# Patient Record
Sex: MALE | Race: WHITE | NOT HISPANIC OR LATINO | Employment: FULL TIME | URBAN - METROPOLITAN AREA
[De-identification: names, ages, dates, MRNs, and addresses within clinical notes are randomized per-mention and may not be internally consistent; named-entity substitution may affect disease eponyms.]

---

## 2023-03-21 ENCOUNTER — HOSPITAL ENCOUNTER (OUTPATIENT)
Facility: CLINIC | Age: 53
Setting detail: OBSERVATION
Discharge: HOME | End: 2023-03-22
Attending: INTERNAL MEDICINE | Admitting: HOSPITALIST
Payer: COMMERCIAL

## 2023-03-21 VITALS
WEIGHT: 210 LBS | OXYGEN SATURATION: 97 % | RESPIRATION RATE: 18 BRPM | BODY MASS INDEX: 27.83 KG/M2 | TEMPERATURE: 98.1 F | SYSTOLIC BLOOD PRESSURE: 135 MMHG | DIASTOLIC BLOOD PRESSURE: 89 MMHG | HEART RATE: 64 BPM | HEIGHT: 73 IN
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
## 2023-03-21 DIAGNOSIS — L03.113 CELLULITIS OF RIGHT UPPER EXTREMITY: Primary | ICD-10-CM

## 2023-03-21 DIAGNOSIS — S61.451D CAT BITE OF HAND, RIGHT, SUBSEQUENT ENCOUNTER: ICD-10-CM

## 2023-03-21 PROBLEM — L03.90 CELLULITIS: Status: ACTIVE | Noted: 2023-03-21

## 2023-03-21 LAB
ALBUMIN SERPL BCG-MCNC: 3.9 G/DL (ref 3.5–5.2)
ALP SERPL-CCNC: 64 U/L (ref 40–129)
ALT SERPL W P-5'-P-CCNC: 17 U/L (ref 10–50)
ANION GAP SERPL CALCULATED.3IONS-SCNC: 6 MMOL/L (ref 7–15)
AST SERPL W P-5'-P-CCNC: 16 U/L (ref 10–50)
BILIRUB SERPL-MCNC: 0.6 MG/DL
BUN SERPL-MCNC: 10.9 MG/DL (ref 6–20)
CALCIUM SERPL-MCNC: 8.7 MG/DL (ref 8.6–10)
CHLORIDE SERPL-SCNC: 102 MMOL/L (ref 98–107)
CREAT SERPL-MCNC: 0.87 MG/DL (ref 0.67–1.17)
DEPRECATED HCO3 PLAS-SCNC: 27 MMOL/L (ref 22–29)
ERYTHROCYTE [DISTWIDTH] IN BLOOD BY AUTOMATED COUNT: 12.4 % (ref 10–15)
GFR SERPL CREATININE-BSD FRML MDRD: >90 ML/MIN/1.73M2
GLUCOSE SERPL-MCNC: 86 MG/DL (ref 70–99)
HCT VFR BLD AUTO: 39.2 % (ref 40–53)
HGB BLD-MCNC: 12.9 G/DL (ref 13.3–17.7)
HOLD SPECIMEN: NORMAL
MCH RBC QN AUTO: 31.1 PG (ref 26.5–33)
MCHC RBC AUTO-ENTMCNC: 32.9 G/DL (ref 31.5–36.5)
MCV RBC AUTO: 95 FL (ref 78–100)
PLATELET # BLD AUTO: 240 10E3/UL (ref 150–450)
POTASSIUM SERPL-SCNC: 3.9 MMOL/L (ref 3.4–5.3)
PROT SERPL-MCNC: 6.6 G/DL (ref 6.4–8.3)
RBC # BLD AUTO: 4.15 10E6/UL (ref 4.4–5.9)
SODIUM SERPL-SCNC: 135 MMOL/L (ref 136–145)
WBC # BLD AUTO: 6.8 10E3/UL (ref 4–11)

## 2023-03-21 PROCEDURE — 96374 THER/PROPH/DIAG INJ IV PUSH: CPT

## 2023-03-21 PROCEDURE — 99222 1ST HOSP IP/OBS MODERATE 55: CPT | Mod: AI | Performed by: STUDENT IN AN ORGANIZED HEALTH CARE EDUCATION/TRAINING PROGRAM

## 2023-03-21 PROCEDURE — 120N000001 HC R&B MED SURG/OB

## 2023-03-21 PROCEDURE — 250N000013 HC RX MED GY IP 250 OP 250 PS 637: Performed by: STUDENT IN AN ORGANIZED HEALTH CARE EDUCATION/TRAINING PROGRAM

## 2023-03-21 PROCEDURE — 80053 COMPREHEN METABOLIC PANEL: CPT | Performed by: STUDENT IN AN ORGANIZED HEALTH CARE EDUCATION/TRAINING PROGRAM

## 2023-03-21 PROCEDURE — 36415 COLL VENOUS BLD VENIPUNCTURE: CPT | Performed by: STUDENT IN AN ORGANIZED HEALTH CARE EDUCATION/TRAINING PROGRAM

## 2023-03-21 PROCEDURE — 250N000011 HC RX IP 250 OP 636: Performed by: STUDENT IN AN ORGANIZED HEALTH CARE EDUCATION/TRAINING PROGRAM

## 2023-03-21 PROCEDURE — 96376 TX/PRO/DX INJ SAME DRUG ADON: CPT

## 2023-03-21 PROCEDURE — 96375 TX/PRO/DX INJ NEW DRUG ADDON: CPT

## 2023-03-21 PROCEDURE — 85027 COMPLETE CBC AUTOMATED: CPT | Performed by: STUDENT IN AN ORGANIZED HEALTH CARE EDUCATION/TRAINING PROGRAM

## 2023-03-21 PROCEDURE — 258N000003 HC RX IP 258 OP 636: Performed by: STUDENT IN AN ORGANIZED HEALTH CARE EDUCATION/TRAINING PROGRAM

## 2023-03-21 PROCEDURE — 87040 BLOOD CULTURE FOR BACTERIA: CPT | Mod: 59 | Performed by: STUDENT IN AN ORGANIZED HEALTH CARE EDUCATION/TRAINING PROGRAM

## 2023-03-21 RX ORDER — NALOXONE HYDROCHLORIDE 0.4 MG/ML
0.4 INJECTION, SOLUTION INTRAMUSCULAR; INTRAVENOUS; SUBCUTANEOUS
Status: DISCONTINUED | OUTPATIENT
Start: 2023-03-21 | End: 2023-03-22 | Stop reason: HOSPADM

## 2023-03-21 RX ORDER — OXYCODONE HYDROCHLORIDE 5 MG/1
5 TABLET ORAL EVERY 4 HOURS PRN
Status: DISCONTINUED | OUTPATIENT
Start: 2023-03-21 | End: 2023-03-22 | Stop reason: HOSPADM

## 2023-03-21 RX ORDER — NALOXONE HYDROCHLORIDE 0.4 MG/ML
0.2 INJECTION, SOLUTION INTRAMUSCULAR; INTRAVENOUS; SUBCUTANEOUS
Status: DISCONTINUED | OUTPATIENT
Start: 2023-03-21 | End: 2023-03-22 | Stop reason: HOSPADM

## 2023-03-21 RX ORDER — PROCHLORPERAZINE 25 MG
25 SUPPOSITORY, RECTAL RECTAL EVERY 12 HOURS PRN
Status: DISCONTINUED | OUTPATIENT
Start: 2023-03-21 | End: 2023-03-22 | Stop reason: HOSPADM

## 2023-03-21 RX ORDER — AMOXICILLIN 250 MG
2 CAPSULE ORAL 2 TIMES DAILY PRN
Status: DISCONTINUED | OUTPATIENT
Start: 2023-03-21 | End: 2023-03-22 | Stop reason: HOSPADM

## 2023-03-21 RX ORDER — IBUPROFEN 600 MG/1
600 TABLET, FILM COATED ORAL EVERY 6 HOURS PRN
Status: DISCONTINUED | OUTPATIENT
Start: 2023-03-21 | End: 2023-03-22 | Stop reason: HOSPADM

## 2023-03-21 RX ORDER — DOXYCYCLINE 100 MG/10ML
100 INJECTION, POWDER, LYOPHILIZED, FOR SOLUTION INTRAVENOUS EVERY 12 HOURS
Status: DISCONTINUED | OUTPATIENT
Start: 2023-03-21 | End: 2023-03-22

## 2023-03-21 RX ORDER — TRAMADOL HYDROCHLORIDE 50 MG/1
50 TABLET ORAL EVERY 6 HOURS PRN
COMMUNITY

## 2023-03-21 RX ORDER — PIPERACILLIN SODIUM, TAZOBACTAM SODIUM 3; .375 G/15ML; G/15ML
3.38 INJECTION, POWDER, LYOPHILIZED, FOR SOLUTION INTRAVENOUS EVERY 6 HOURS
Status: DISCONTINUED | OUTPATIENT
Start: 2023-03-21 | End: 2023-03-22

## 2023-03-21 RX ORDER — PROCHLORPERAZINE MALEATE 10 MG
10 TABLET ORAL EVERY 6 HOURS PRN
Status: DISCONTINUED | OUTPATIENT
Start: 2023-03-21 | End: 2023-03-22 | Stop reason: HOSPADM

## 2023-03-21 RX ORDER — ACETAMINOPHEN 325 MG/1
650 TABLET ORAL EVERY 6 HOURS PRN
Status: DISCONTINUED | OUTPATIENT
Start: 2023-03-21 | End: 2023-03-22 | Stop reason: HOSPADM

## 2023-03-21 RX ORDER — BISACODYL 10 MG
10 SUPPOSITORY, RECTAL RECTAL DAILY PRN
Status: DISCONTINUED | OUTPATIENT
Start: 2023-03-21 | End: 2023-03-22 | Stop reason: HOSPADM

## 2023-03-21 RX ORDER — HYDROMORPHONE HCL IN WATER/PF 6 MG/30 ML
0.2 PATIENT CONTROLLED ANALGESIA SYRINGE INTRAVENOUS
Status: DISCONTINUED | OUTPATIENT
Start: 2023-03-21 | End: 2023-03-22 | Stop reason: HOSPADM

## 2023-03-21 RX ORDER — AMOXICILLIN 250 MG
1 CAPSULE ORAL 2 TIMES DAILY PRN
Status: DISCONTINUED | OUTPATIENT
Start: 2023-03-21 | End: 2023-03-22 | Stop reason: HOSPADM

## 2023-03-21 RX ORDER — POLYETHYLENE GLYCOL 3350 17 G/17G
17 POWDER, FOR SOLUTION ORAL DAILY PRN
Status: DISCONTINUED | OUTPATIENT
Start: 2023-03-21 | End: 2023-03-22 | Stop reason: HOSPADM

## 2023-03-21 RX ORDER — HYDROMORPHONE HCL IN WATER/PF 6 MG/30 ML
0.4 PATIENT CONTROLLED ANALGESIA SYRINGE INTRAVENOUS
Status: DISCONTINUED | OUTPATIENT
Start: 2023-03-21 | End: 2023-03-22 | Stop reason: HOSPADM

## 2023-03-21 RX ORDER — LIDOCAINE 40 MG/G
CREAM TOPICAL
Status: DISCONTINUED | OUTPATIENT
Start: 2023-03-21 | End: 2023-03-22 | Stop reason: HOSPADM

## 2023-03-21 RX ORDER — ONDANSETRON 4 MG/1
4 TABLET, ORALLY DISINTEGRATING ORAL EVERY 6 HOURS PRN
Status: DISCONTINUED | OUTPATIENT
Start: 2023-03-21 | End: 2023-03-22 | Stop reason: HOSPADM

## 2023-03-21 RX ORDER — ACETAMINOPHEN 650 MG/1
650 SUPPOSITORY RECTAL EVERY 6 HOURS PRN
Status: DISCONTINUED | OUTPATIENT
Start: 2023-03-21 | End: 2023-03-22 | Stop reason: HOSPADM

## 2023-03-21 RX ORDER — ONDANSETRON 2 MG/ML
4 INJECTION INTRAMUSCULAR; INTRAVENOUS EVERY 6 HOURS PRN
Status: DISCONTINUED | OUTPATIENT
Start: 2023-03-21 | End: 2023-03-22 | Stop reason: HOSPADM

## 2023-03-21 RX ADMIN — PIPERACILLIN AND TAZOBACTAM 3.38 G: 3; .375 INJECTION, POWDER, FOR SOLUTION INTRAVENOUS at 17:13

## 2023-03-21 RX ADMIN — DOXYCYCLINE 100 MG: 100 INJECTION, POWDER, LYOPHILIZED, FOR SOLUTION INTRAVENOUS at 18:54

## 2023-03-21 RX ADMIN — ACETAMINOPHEN 650 MG: 325 TABLET, FILM COATED ORAL at 16:52

## 2023-03-21 RX ADMIN — VANCOMYCIN HYDROCHLORIDE 1250 MG: 10 INJECTION, POWDER, LYOPHILIZED, FOR SOLUTION INTRAVENOUS at 20:02

## 2023-03-21 RX ADMIN — PIPERACILLIN AND TAZOBACTAM 3.38 G: 3; .375 INJECTION, POWDER, FOR SOLUTION INTRAVENOUS at 23:10

## 2023-03-21 RX ADMIN — OXYCODONE HYDROCHLORIDE 5 MG: 5 TABLET ORAL at 21:49

## 2023-03-21 RX ADMIN — OXYCODONE HYDROCHLORIDE 5 MG: 5 TABLET ORAL at 16:52

## 2023-03-21 ASSESSMENT — ACTIVITIES OF DAILY LIVING (ADL)
ADLS_ACUITY_SCORE: 35

## 2023-03-21 NOTE — PHARMACY-VANCOMYCIN DOSING SERVICE
"Pharmacy Vancomycin Initial Note  Date of Service 2023  Patient's  1970  52 year old, male    Indication: Skin and Soft Tissue Infection and cat scratch/bite    Current estimated CrCl = Estimated Creatinine Clearance: 136.9 mL/min (based on SCr of 0.87 mg/dL).    Creatinine for last 3 days  3/21/2023:  5:21 PM Creatinine 0.87 mg/dL    Recent Vancomycin Level(s) for last 3 days  No results found for requested labs within last 72 hours.      Vancomycin IV Administrations (past 72 hours)      No vancomycin orders with administrations in past 72 hours.                Nephrotoxins and other renal medications (From now, onward)    Start     Dose/Rate Route Frequency Ordered Stop    23 1900  vancomycin (VANCOCIN) 1,250 mg in 0.9% NaCl 250 mL intermittent infusion         1,250 mg  over 90 Minutes Intravenous EVERY 12 HOURS 23 1835      23 1700  piperacillin-tazobactam (ZOSYN) 3.375 g vial to attach to  mL bag        Note to Pharmacy: For [facility], [facility] and [facility]: For Zosyn-naive patients, use the \"Zosyn initial dose + extended infusion\" order panel.    3.375 g  over 30 Minutes Intravenous EVERY 6 HOURS 23 1610      23 1609  ibuprofen (ADVIL/MOTRIN) tablet 600 mg         600 mg Oral EVERY 6 HOURS PRN 23 1609            Contrast Orders - past 72 hours (72h ago, onward)    None          [employer] Prediction of Planned Initial Vancomycin Regimen  Loading dose: N/A  Regimen: 1250 mg IV every 12 hours.  Start time: 18:36 on 2023  Exposure target: AUC24 (range)400-600 mg/L.hr   AUC24,ss: 491 mg/L.hr  Probability of AUC24 > 400: 72 %  Ctrough,ss: 15.2 mg/L  Probability of Ctrough,ss > 20: 27 %  Probability of nephrotoxicity (Lodise MARCUS ): 10 %          Plan:  1. Start vancomycin  1250 mg IV q12h.   2. Vancomycin monitoring method: AUC  3. Vancomycin therapeutic monitoring goal: 400-600 mg*h/L  4. Pharmacy will check vancomycin levels as appropriate in 1-3 Days.  "   5. Serum creatinine levels will be ordered daily for the first week of therapy and at least twice weekly for subsequent weeks.      Tia Little RPH

## 2023-03-21 NOTE — H&P
[facility]    History and Physical - Hospitalist Service       Date of Admission:  3/21/2023    Assessment & Plan      Abdoul Bobby is a 52 year old male admitted on 3/21/2023. He presents with spreading cellulitis without lymphadenitis after a cat scratch on 3/19/2023.    Cellulitis due to cat bite and scratch  *Suffered cat bite and scratch on 3/19/2023 at a birthday party for his nephew.  The cat historically is irascible, and this behavior is not out of the abnormal for him.  *Patient believes that the cat is vaccinated for rabies, he will double check with the owners.  *Seen at urgent care on 3/20 due to hand swelling.  On 3/21 he had soreness and swelling extending up the length of his arm in a lymphangitic pattern.  See pictures in media tab.  - Inpatient  - Vancomycin and Zosyn  - No obvious lymphadenitis at this time, but will also start doxycycline  - If cat has not been vaccinated for rabies on double check, will order immunoglobulin and vaccine.  - Consult orthopedics due to cellulitis crossing hand and elbow.  - Consult infectious disease due to vector of transmission and rapid spread.  -Collect stat CBC, CMP, blood cultures (note the patient has been on Augmentin for 1 to 2 days and has received Zosyn on day of admission)    Addendum: cat was vaccinated May 2022       Diet: Combination Diet Regular Diet Adult  DVT Prophylaxis: Pneumatic Compression Devices  Tinajero Catheter: Not present  Lines: None     Cardiac Monitoring: None  Code Status: Full Code    Clinically Significant Risk Factors Present on Admission                               Disposition Plan      Expected Discharge Date: 03/23/2023                  Siva Leong MD  Hospitalist Service  [facility]  Securely message with Josiane (more info)  Text page via [facility] Paging/Directory     ______________________________________________________________________    Chief Complaint   Cat  scratch    History is obtained from the patient and electronic health record    History of Present Illness   Abdoul Bobby is a 52 year old male who has limited past medical history.  He presents to the hospital on 3/21/2023 after transfer from outside urgency room.  He was at a birthday party on 3/19/2023.  He was bitten and scratched by a cat multiple times on his right hand at that time.  This was reportedly unprovoked, however the cat has a history of being irascible and aggressive.  The cat is otherwise been eating and drinking well per report.  The cat is reportedly vaccinated for rabies.  He was seen in urgent care on 3/20.  At that time he was given Augmentin.  He noted on 3/21 when he woke that there was spreading erythema up his arm with associated tenderness around his forearm and elbow.    He otherwise has no associated significant symptoms    Past Medical History    Past Medical History:   Diagnosis Date     Back pain        Past Surgical History   Past Surgical History:   Procedure Laterality Date     APPENDECTOMY       APPENDECTOMY       BACK SURGERY       BACK SURGERY       ELBOW SURGERY       ELBOW SURGERY       KNEE SURGERY       KNEE SURGERY Bilateral     ACL and meniscus repair (2 surgeries)     SINUS SURGERY      x 2     TONSILLECTOMY       ZZC CRUZ W/O FACETEC FORAMOT/[facility]  VRT SEG, CERVICAL Left 2016    Procedure: LEFT C5-6 C6-7 HEMILAMINECTOMY, MICRODISCECTOMY;  Surgeon: Teresa Recinos MD;  Location: [facility];  Service: Spine       Prior to Admission Medications   Prior to Admission Medications   Prescriptions Last Dose Informant Patient Reported? Taking?   ALBUTEROL 90 MCG/ACT IN AERS  Self No No   Si-2 puffs Q4-6hrs PRN wheezing   HYDROmorphone (DILAUDID) 4 MG tablet   No No   Sig: Take 1 tablet (4 mg) by mouth every 6 hours as needed for moderate to severe pain   TRAMADOL HCL PO  Self Yes No   Sig: Take 50 mg by mouth every 6 hours as needed     cyclobenzaprine (FLEXERIL) 5 MG tablet   No No   Sig: Take 1 tablet (5 mg) by mouth 3 times daily as needed for muscle spasms   methylprednisoLONE (MEDROL DOSEPAK) 4 MG tablet   No No   Sig: Follow package instructions   multivitamin, therapeutic with minerals (MULTI-VITAMIN) TABS  Self Yes No   Sig: Take 1 tablet by mouth daily [employer] gummy   oxyCODONE (ROXICODONE) 5 MG immediate release tablet   No No   Sig: Take 1-2 tablets (5-10 mg) by mouth every 3 hours as needed      Facility-Administered Medications: None         Physical Exam   Vital Signs: Temp: 97.2  F (36.2  C) Temp src: Oral BP: (!) 155/94 Pulse: 56   Resp: 20 SpO2: 100 % O2 Device: None (Room air)    Weight: 0 lbs 0 oz    Constitutional: Awake, alert, cooperative, no apparent distress  Respiratory: Clear to auscultation bilaterally, no crackles or wheezing  Cardiovascular: Regular rate and rhythm, normal S1 and S2, and no murmur noted  GI: Normal bowel sounds, soft, non-distended, non-tender  Skin/Integumen: Scattered puncture wounds on right posterior hand and palmar surface.  There is significant erythema and induration of the right hand.  There is spreading redness up the right arm across the right elbow, right forearm, and a linear track of erythema up the inner right arm.  This area is tender to palpation mildly.  Other:       Medical Decision Making       65 MINUTES SPENT BY ME on the date of service doing chart review, history, exam, documentation & further activities per the note.      Data   ------------------------- PAST 24 HR DATA REVIEWED -----------------------------------------------        Imaging results reviewed over the past 24 hrs:   No results found for this or any previous visit (from the past 24 hour(s)).

## 2023-03-21 NOTE — PHARMACY-ADMISSION MEDICATION HISTORY
Pharmacy Medication History  Admission medication history interview status for the 3/21/2023  admission is complete. See EPIC admission navigator for prior to admission medications     Location of Interview: Phone  Medication history sources: Patient and Care Everywhere    Significant changes made to the medication list:  Added: augmentin--patient states he took the first dose last night    In the past week, patient estimated taking medication this percent of the time: greater than 90%    Medication Affordability:  Not including over the counter (OTC) medications, was there a time in the past 12 months when you did not take your medications as prescribed because of cost?: Yes (Arnuity was not covered by insurance so has not been taking)  Has this happened in the past 3 months?: Yes    Additional medication history information:   none    Medication reconciliation completed by provider prior to medication history? No    Time spent in this activity: 15 minutes    Prior to Admission medications    Medication Sig Last Dose Taking? Auth Provider Long Term End Date   amoxicillin-clavulanate (AUGMENTIN) 875-125 MG tablet Take 1 tablet by mouth 2 times daily For 10 days (started 3/20/23 pm) 3/21/2023 at am Yes Unknown, Entered By History     multivitamin w/minerals (THERA-VIT-M) tablet Take 1 tablet by mouth daily 3/21/2023 Yes Unknown, Entered By History     traMADol (ULTRAM) 50 MG tablet Take 50 mg by mouth every 6 hours as needed for severe pain (7-10) PRN Yes Unknown, Entered By History     ALBUTEROL 90 MCG/ACT IN AERS 1-2 puffs Q4-6hrs PRN wheezing PRN  Elliott Lea, PA-C         The information provided in this note is only as accurate as the sources available at the time of update(s)

## 2023-03-21 NOTE — PLAN OF CARE
Goal Outcome Evaluation:    Shift Summary [id number]    Admitting Diagnosis: Cellulitis   Vitals VSS   Pain 6/10. Taking Oxycodone, Tylenol  PRN. Last dose 1652  A&Ox4  Voiding independent in the BR  Mobility Independent   Tele None  CMS Inatact, baseline intermittent N/T r/t to previous spinal surgery  Lung Sounds WDL on RA  GI WDL  Dressing R hand Cat bite wound open to air  Diet Regular  PIV SL ex of intermittent abx

## 2023-03-22 LAB
ANION GAP SERPL CALCULATED.3IONS-SCNC: 7 MMOL/L (ref 7–15)
BUN SERPL-MCNC: 8.8 MG/DL (ref 6–20)
CALCIUM SERPL-MCNC: 8.7 MG/DL (ref 8.6–10)
CHLORIDE SERPL-SCNC: 106 MMOL/L (ref 98–107)
CREAT SERPL-MCNC: 0.93 MG/DL (ref 0.67–1.17)
DEPRECATED HCO3 PLAS-SCNC: 24 MMOL/L (ref 22–29)
ERYTHROCYTE [DISTWIDTH] IN BLOOD BY AUTOMATED COUNT: 12.6 % (ref 10–15)
GFR SERPL CREATININE-BSD FRML MDRD: >90 ML/MIN/1.73M2
GLUCOSE SERPL-MCNC: 117 MG/DL (ref 70–99)
HCT VFR BLD AUTO: 38.2 % (ref 40–53)
HGB BLD-MCNC: 12.4 G/DL (ref 13.3–17.7)
MCH RBC QN AUTO: 31 PG (ref 26.5–33)
MCHC RBC AUTO-ENTMCNC: 32.5 G/DL (ref 31.5–36.5)
MCV RBC AUTO: 96 FL (ref 78–100)
PLATELET # BLD AUTO: 211 10E3/UL (ref 150–450)
POTASSIUM SERPL-SCNC: 4.2 MMOL/L (ref 3.4–5.3)
RBC # BLD AUTO: 4 10E6/UL (ref 4.4–5.9)
SODIUM SERPL-SCNC: 137 MMOL/L (ref 136–145)
WBC # BLD AUTO: 5.8 10E3/UL (ref 4–11)

## 2023-03-22 PROCEDURE — 96375 TX/PRO/DX INJ NEW DRUG ADDON: CPT

## 2023-03-22 PROCEDURE — 250N000011 HC RX IP 250 OP 636: Performed by: INTERNAL MEDICINE

## 2023-03-22 PROCEDURE — 96376 TX/PRO/DX INJ SAME DRUG ADON: CPT

## 2023-03-22 PROCEDURE — 85027 COMPLETE CBC AUTOMATED: CPT | Performed by: STUDENT IN AN ORGANIZED HEALTH CARE EDUCATION/TRAINING PROGRAM

## 2023-03-22 PROCEDURE — 250N000011 HC RX IP 250 OP 636: Performed by: STUDENT IN AN ORGANIZED HEALTH CARE EDUCATION/TRAINING PROGRAM

## 2023-03-22 PROCEDURE — 250N000013 HC RX MED GY IP 250 OP 250 PS 637: Performed by: STUDENT IN AN ORGANIZED HEALTH CARE EDUCATION/TRAINING PROGRAM

## 2023-03-22 PROCEDURE — 99239 HOSP IP/OBS DSCHRG MGMT >30: CPT | Performed by: STUDENT IN AN ORGANIZED HEALTH CARE EDUCATION/TRAINING PROGRAM

## 2023-03-22 PROCEDURE — 36415 COLL VENOUS BLD VENIPUNCTURE: CPT | Performed by: STUDENT IN AN ORGANIZED HEALTH CARE EDUCATION/TRAINING PROGRAM

## 2023-03-22 PROCEDURE — 258N000003 HC RX IP 258 OP 636: Performed by: STUDENT IN AN ORGANIZED HEALTH CARE EDUCATION/TRAINING PROGRAM

## 2023-03-22 PROCEDURE — 82310 ASSAY OF CALCIUM: CPT | Performed by: STUDENT IN AN ORGANIZED HEALTH CARE EDUCATION/TRAINING PROGRAM

## 2023-03-22 PROCEDURE — 99222 1ST HOSP IP/OBS MODERATE 55: CPT | Performed by: INTERNAL MEDICINE

## 2023-03-22 PROCEDURE — G0378 HOSPITAL OBSERVATION PER HR: HCPCS

## 2023-03-22 RX ORDER — CEFTRIAXONE 2 G/1
2 INJECTION, POWDER, FOR SOLUTION INTRAMUSCULAR; INTRAVENOUS EVERY 24 HOURS
Status: DISCONTINUED | OUTPATIENT
Start: 2023-03-22 | End: 2023-03-22 | Stop reason: HOSPADM

## 2023-03-22 RX ORDER — OXYCODONE HYDROCHLORIDE 5 MG/1
2.5 TABLET ORAL EVERY 4 HOURS PRN
Qty: 4 TABLET | Refills: 0 | Status: SHIPPED | OUTPATIENT
Start: 2023-03-22

## 2023-03-22 RX ADMIN — PIPERACILLIN AND TAZOBACTAM 3.38 G: 3; .375 INJECTION, POWDER, FOR SOLUTION INTRAVENOUS at 05:02

## 2023-03-22 RX ADMIN — VANCOMYCIN HYDROCHLORIDE 1250 MG: 10 INJECTION, POWDER, LYOPHILIZED, FOR SOLUTION INTRAVENOUS at 08:05

## 2023-03-22 RX ADMIN — OXYCODONE HYDROCHLORIDE 5 MG: 5 TABLET ORAL at 08:03

## 2023-03-22 RX ADMIN — DOXYCYCLINE 100 MG: 100 INJECTION, POWDER, LYOPHILIZED, FOR SOLUTION INTRAVENOUS at 05:46

## 2023-03-22 RX ADMIN — CEFTRIAXONE SODIUM 2 G: 2 INJECTION, POWDER, FOR SOLUTION INTRAMUSCULAR; INTRAVENOUS at 10:39

## 2023-03-22 ASSESSMENT — ACTIVITIES OF DAILY LIVING (ADL)
ADLS_ACUITY_SCORE: 35

## 2023-03-22 NOTE — CONSULTS
ID consult dictated IMP 1 53 yo male acute R hand cat bite related infection, impressive initial appaearance and lymphangitis wo sepsis, should be pasturella but with lymphangitis ? Strep also or mainly    REC rapidly better, unlikely bacteremic and no sign deep infection , very predictable micro,no need doxy or this type broad ABX, dose of ceftriaxone now I am Ok back to augmentin for 10 days and home OK as soon as now

## 2023-03-22 NOTE — PLAN OF CARE
Goal Outcome Evaluation:    Admitting Diagnosis: Cellulitis   Cat bite of hand, right, subsequent encounter    Vitals VSS  Pain 5/10. Taking OxyPRN. Last dose 0800 am  A&Ox4  Voiding BR  Mobility indep  Tele None  CMS Intact  Lung Sounds WDLon RA  GI WDL  Dressing LEISA    Orders Placed This Encounter      Combination Diet Regular Diet Adult      Diet     Plan: discharging Home, Pt driving self. AVS printed instructions reviewed with Pt, with teaches back. Patient self driving home, MD aware.

## 2023-03-22 NOTE — DISCHARGE SUMMARY
[facility]  Hospitalist Discharge Summary      Date of Admission:  3/21/2023  Date of Discharge:  3/22/2023  Discharging Provider: Coy Rush PA-C  Discharge Service: Hospitalist Service    Discharge Diagnoses   Cellulitis of right upper extremity secondary to cat bite    Follow-ups Needed After Discharge   Follow-up Appointments     Follow-up and recommended labs and tests       Follow up with primary care provider, BILLIE CLIFTON, within 7 days   for hospital follow- up.  No follow up labs or test are needed.           Unresulted Labs Ordered in the Past 30 Days of this Admission     Date and Time Order Name Status Description    3/21/2023  4:07 PM Blood Culture Arm, Right Preliminary     3/21/2023  4:07 PM Blood Culture Hand, Left Preliminary       These results will be followed up by PCP    Discharge Disposition   Discharged to home  Condition at discharge: Stable    Hospital Course   Abdoul Bobby is a 52 year old male with PMHx of asthma and back pain.  Patient suffered a cat bite to his right hand 3/19.  He was seen in urgent care on 3/20 was prescribed Augmentin, of which she took 2 doses but due to worsening of hand Thiemann edema which was spreading up his right forearm into his elbow he presented to the ER.  He was started on broad-spectrum antibiotics and admitted for further care management.  The cat was vaccinated against rabies in March 2022.  Diabetes consulted and recommended no indication for intervention this morning.  Infectious disease consulted -assessed the patient had rapid improvement on IV antibiotics.  Patient given an additional dose of IV ceftriaxone this morning.  Discharged to home on resumption of oral Augmentin.  - Continue course of Augmentin 875-125 p.o. twice daily x9 additional days.  - Rx 4 tablets of oxycodone 5 mg.  Take 1/2 tablet every 4 hours as needed for breakthrough pain over the next couple days.  -Follow-up with PCP within 1  week          Consultations This Hospital Stay   PHARMACY TO DOSE VANCO  INFECTIOUS DISEASES IP CONSULT  ORTHOPEDIC SURGERY IP CONSULT    Code Status   Full Code    Time Spent on this Encounter   I, Coy Rush PA-C, personally saw the patient today and spent greater than 30 minutes discharging this patient.       Coy Rush PA-C  [facility]  [address] [place] [address]  Phone: [phone]  Fax: [phone]  ______________________________________________________________________    Physical Exam   Vital Signs: Temp: 98.1  F (36.7  C) Temp src: Oral BP: 135/89 Pulse: 64   Resp: 18 SpO2: 97 % O2 Device: None (Room air)    Weight: 210 lbs 0 oz    Constitutional: awake, alert, in no acute distress. A&Ox3  Eyes: EOM, PERRLA. Sclera clear, conjunctiva normal. Anicteric   ENT: Normocephalic, without obvious abnormality, atraumatic.   Respiratory: No increased work of breathing.   Musculoskeletal:  Full range of motion noted.  Right hand swelling and erythema much improved compared to admission, no further redness extending up the right forearm or elbow.   Neurologic: Cranial nerves II-XII are grossly intact.   Neuropsychiatric: General: normal, calm and normal eye contact. Normal affect        Primary Care Physician   BILLIE CLIFTON    Discharge Orders      Reason for your hospital stay    You were admitted due to an infection in your right hand that was caused by a cat bite.  You were treated with IV antibiotics that has showed significant improvement.  Your evaluated by both orthopedics and infectious disease who recommended no need for procedural intervention and to continue the oral antibiotics you are previously prescribed.     Follow-up and recommended labs and tests     Follow up with primary care provider, BILLIE CLIFTON, within 7 days for hospital follow- up.  No follow up labs or test are needed.     Activity    Your activity upon discharge: activity  as tolerated     Diet    Regular diet       Significant Results and Procedures   Most Recent 3 CBC's:  Recent Labs   Lab Test 03/22/23  0654 03/21/23  1721   WBC 5.8 6.8   HGB 12.4* 12.9*   MCV 96 95    240     Most Recent 3 BMP's:  Recent Labs   Lab Test 03/22/23  0654 03/21/23  1721 09/06/16  1146    135*  --    POTASSIUM 4.2 3.9  --    CHLORIDE 106 102  --    CO2 24 27  --    BUN 8.8 10.9  --    CR 0.93 0.87 0.99   ANIONGAP 7 6*  --    SANDRA 8.7 8.7  --    * 86  --      Most Recent 3 INR's:No lab results found.,   Results for orders placed or performed during the hospital encounter of 02/02/14   Lumbar spine MRI w & w/o contrast - surgery <10yrs    Narrative    MR LUMBAR SPINE WITHOUT AND WITH CONTRAST  2/2/2014 11:03 AM     HISTORY:  Back and bilateral leg pain. Prior surgery in 2007.    TECHNIQUE: Multiplanar multisequence images were obtained through the  lumbar spine without and with contrast. 10 mL of Gadavist given.    COMPARISON: None.    FINDINGS: Five lumbar type vertebral bodies are presumed. Posterior  alignment is normal. The conus medullaris is at the T12-L1 level and  appears normal. There is mild multilevel disc space narrowing.  Discogenic marrow changes are present at L4-L5. Bone marrow signal  intensity is otherwise normal. Postcontrast images do not show any  abnormal intradural or intramedullary enhancement.    L1-L2: Minimal disc bulging and facet hypertrophy. No stenosis.    L2-L3: There is a small to moderate left paramedian to posterolateral  disc protrusion along with broad-based disc bulging and facet  hypertrophy. The findings are causing mild to moderate central canal  stenosis and left-sided neural foraminal stenosis.    L3-L4: Broad-based disc bulge or disc protrusion is present along with  some mild to moderate facet hypertrophy. Findings are causing some  mild central canal stenosis and mild to moderate bilateral neural  foraminal stenosis.    L4-L5: Prior  right-sided hemilaminectomy procedure. There is mild to  moderate facet hypertrophy and broad-based disc bulging or disc  protrusion. Findings are resulting in moderate right-sided foraminal  stenosis, mild central canal stenosis and mild left-sided foraminal  stenosis.    L5-S1: Mild facet hypertrophy and minimal disc bulging are present  causing some mild bilateral neural foraminal stenosis but no central  canal stenosis.    Paraspinal soft tissues: Unremarkable as visualized.      Impression    IMPRESSION:  1. Prior right-sided L4-L5 hemilaminectomy procedure.  2. Small to moderate left paramedian posterolateral L2-L3 disc  protrusion with secondary mild to moderate central canal stenosis and  left-sided neural foraminal stenosis. This is superimposed upon  degenerative disc disease.  3. L3-L4 degenerative disc and facet disease with secondary mild  central and mild to moderate bilateral neural foraminal stenosis.  4. L4-L5 degenerative disc and facet disease with secondary mild  central and moderate right-sided foraminal stenosis.    BILL RICH MD   XR Lumbar Epidural Injection    Narrative    XR LUMBAR EPIDURAL INJECTION             2/3/2014 2:33 PM       History:  Lt transforaminal L2-3 TAHIR,.     Procedure:  The risks (bleeding, infection, reaction to contrast and  medications) and benefits of the procedure were explained to the  patient and consent was obtained.  Using sterile technique and  fluoroscopic guidance a #22 gauge spinal needle was placed into the  left L2-3 foramen using a posterior- lateral approach.  A small amount  of contrast was injected confirming satisfactory position of the  needle tip.  20 mg of Dexamethasone mixed with 3 mL Lidocaine 1% were  injected.  No initial complication.        Fluoroscopy time: 0.3 minutes    Medications used: 3 mL of 1% lidocaine for local anesthetic    The patient's pain levels (0-10 scale) were as follows:                          PRE INJECTION      Low back                 4                                              Right leg                 0                                            Left leg                    0                                                  POST INJECTION   Low back               2   Right leg                0   Left leg                   0        Impression    Impression:  Technically successful  transforaminal lumbar epidural  steroid injection  with favorable initial pain relief. Long term  results pending.    SHAHZAD YEN PA-C       Discharge Medications   Current Discharge Medication List      START taking these medications    Details   oxyCODONE (ROXICODONE) 5 MG tablet Take 0.5 tablets (2.5 mg) by mouth every 4 hours as needed for breakthrough pain or severe pain (7-10)  Qty: 4 tablet, Refills: 0    Associated Diagnoses: Cellulitis of right upper extremity; Cat bite of hand, right, subsequent encounter         CONTINUE these medications which have NOT CHANGED    Details   amoxicillin-clavulanate (AUGMENTIN) 875-125 MG tablet Take 1 tablet by mouth 2 times daily For 10 days (started 3/20/23 pm)      multivitamin w/minerals (THERA-VIT-M) tablet Take 1 tablet by mouth daily      traMADol (ULTRAM) 50 MG tablet Take 50 mg by mouth every 6 hours as needed for severe pain (7-10)      ALBUTEROL 90 MCG/ACT IN AERS 1-2 puffs Q4-6hrs PRN wheezing  Qty: 1, Refills: 2    Associated Diagnoses: Other tenosynovitis of hand and wrist; Mild intermittent asthma with exacerbation           Allergies   Allergies   Allergen Reactions     Cats Itching     Itching when I come into contact     Dogs Itching

## 2023-03-22 NOTE — CONSULTS
[facility]    Orthopedic Consultation    Abdoul Bobby MRN# [id number]   Age: 52 year old YOB: 1970     Date of Admission: 3/21/2023    Reason for consult: Right hand cat bite       Requesting provider: Siva Leong       Level of consult: Consult, follow and place orders           Assessment and Plan:   Assessment:   Right hand/arm cellulitis from cat bite, resolving      Plan:   Patient's cellulitis has significantly improved since starting IV antibiotics.  No sign of abscess, does not require surgical intervention  Okay to discharge on oral antibiotics per ID recommendations  Begin twice daily warm water and Hibiclens soaks  Follow-up with hand provider if symptoms worsen           Chief Complaint:   Right hand pain and redness         History of Present Illness:   Abdoul Bobby is a 52 year old male who has limited past medical history.  He presented to the hospital on 3/21/2023 after transfer from outside urgency room in [place].  He was at a birthday party on 3/19/[birth year]23.  He was bitten and scratched by a cat multiple times on his right hand at that time.  This was reportedly unprovoked, however the cat has a history of being aggressive. The cat is reportedly vaccinated for rabies.  He was seen in urgent care on 3/20.  At that time he was given Augmentin.  He noted on 3/21 when he woke that there was spreading erythema up his arm with associated tenderness around his forearm and elbow, admission recommended.           Past Medical History:     Past Medical History:   Diagnosis Date     Back pain              Past Surgical History:     Past Surgical History:   Procedure Laterality Date     APPENDECTOMY       APPENDECTOMY       BACK SURGERY       BACK SURGERY  2003/2007     ELBOW SURGERY       ELBOW SURGERY       KNEE SURGERY       KNEE SURGERY Bilateral     ACL and meniscus repair (2 surgeries)     SINUS SURGERY      x 2     TONSILLECTOMY       CRIST[facility]BAL CRUZ W/O  FACETEC FORAMOT/[facility]  VRT SEG, CERVICAL Left 2016    Procedure: LEFT C5-6 C6-7 HEMILAMINECTOMY, MICRODISCECTOMY;  Surgeon: Teresa Recinos MD;  Location: [facility];  Service: Spine             Social History:     Social History     Tobacco Use     Smoking status: Former     Types: Cigarettes     Quit date: 2005     Years since quittin.5     Smokeless tobacco: Former     Quit date: 2008     Tobacco comments:     chew   Substance Use Topics     Alcohol use: No     Comment: Alcoholic Drinks/day: Tx - sober since              Family History:   No family history on file.          Immunizations:     VACCINE/DOSE   Diptheria   DPT   DTAP   HBIG   Hepatitis A   Hepatitis B   HIB   Influenza   Measles   Meningococcal   MMR   Mumps   Pneumococcal   Polio   Rubella   Small Pox   TDAP   Varicella   Zoster             Allergies:     Allergies   Allergen Reactions     Cats Itching     Itching when I come into contact     Dogs Itching             Medications:     Current Facility-Administered Medications   Medication     acetaminophen (TYLENOL) tablet 650 mg    Or     acetaminophen (TYLENOL) Suppository 650 mg     bisacodyl (DULCOLAX) suppository 10 mg     cefTRIAXone (ROCEPHIN) 2 g vial to attach to  ml bag for ADULTS or NS 50 ml bag for PEDS     HYDROmorphone (DILAUDID) injection 0.2 mg     HYDROmorphone (DILAUDID) injection 0.4 mg     ibuprofen (ADVIL/MOTRIN) tablet 600 mg     lidocaine (LMX4) cream     lidocaine 1 % 0.1-1 mL     melatonin tablet 1 mg     naloxone (NARCAN) injection 0.2 mg    Or     naloxone (NARCAN) injection 0.4 mg    Or     naloxone (NARCAN) injection 0.2 mg    Or     naloxone (NARCAN) injection 0.4 mg     ondansetron (ZOFRAN ODT) ODT tab 4 mg    Or     ondansetron (ZOFRAN) injection 4 mg     oxyCODONE (ROXICODONE) tablet 5 mg     oxyCODONE IR (ROXICODONE) half-tab 2.5 mg     polyethylene glycol (MIRALAX) Packet 17 g     prochlorperazine (COMPAZINE) injection  "10 mg    Or     prochlorperazine (COMPAZINE) tablet 10 mg    Or     prochlorperazine (COMPAZINE) suppository 25 mg     senna-docusate (SENOKOT-S/PERICOLACE) 8.6-50 MG per tablet 1 tablet    Or     senna-docusate (SENOKOT-S/PERICOLACE) 8.6-50 MG per tablet 2 tablet     sodium chloride (PF) 0.9% PF flush 3 mL     sodium chloride (PF) 0.9% PF flush 3 mL             Review of Systems:   ROS:  10 point ROS neg other than the symptoms noted above in the HPI.            Physical Exam:   All vitals have been reviewed  Patient Vitals for the past 24 hrs:   BP Temp Temp src Pulse Resp SpO2 Height Weight   03/21/23 2315 135/89 98.1  F (36.7  C) Oral 64 18 97 % -- --   03/21/23 1710 -- -- -- -- -- -- 1.854 m (6' 1\") 95.3 kg (210 lb)   03/21/23 1559 (!) 155/94 -- -- -- -- -- -- --   03/21/23 1553 (!) 154/96 97.2  F (36.2  C) Oral 56 20 100 % -- --     No intake or output data in the 24 hours ending 03/22/23 1115      Physical Exam   Temp: 98.1  F (36.7  C) Temp src: Oral BP: 135/89 Pulse: 64   Resp: 18 SpO2: 97 % O2 Device: None (Room air)    Vital Signs with Ranges  Temp:  [97.2  F (36.2  C)-98.1  F (36.7  C)] 98.1  F (36.7  C)  Pulse:  [56-64] 64  Resp:  [18-20] 18  BP: (135-155)/(89-96) 135/89  SpO2:  [97 %-100 %] 97 %  210 lbs 0 oz    Constitutional: Pleasant, alert, appropriate, following commands.  HEENT: Head atraumatic normocephalic. Pupils equal round and reactive to light.  Respiratory: Unlabored breathing no audible wheeze  Cardiovascular: Regular rate and rhythm per pulses  GI: Abdomen non-distended.  Lymph/Hematologic: No lymphadenopathy in areas examined  Genitourinary:  No flores  Skin: No rashes, no cyanosis, no edema.  Musculoskeletal: On physical exam of the right upper extremity, there are 2 bite marks on the dorsal aspect of the right hand.  Minimal erythema surrounding the bite marks otherwise the erythema has nearly completely resolved.  The lymphangitic streaking has also resolved.  He is able to flex and " extend his right wrist and fingers without pain.  Denies pain with resisted finger flexion or extension bilaterally.  Mild stiffness with right finger range of motion.  Sensation is intact and equal bilaterally.  Brisk capillary refill intact and equal.  Neurologic: normal without focal findings, mental status, speech normal, alert and oriented x iii  Neuropsychiatric: Stable            Data:   All laboratory data reviewed  Results for orders placed or performed during the hospital encounter of 03/21/23   CBC with platelets     Status: Abnormal   Result Value Ref Range    WBC Count 6.8 4.0 - 11.0 10e3/uL    RBC Count 4.15 (L) 4.40 - 5.90 10e6/uL    Hemoglobin 12.9 (L) 13.3 - 17.7 g/dL    Hematocrit 39.2 (L) 40.0 - 53.0 %    MCV 95 78 - 100 fL    MCH 31.1 26.5 - 33.0 pg    MCHC 32.9 31.5 - 36.5 g/dL    RDW 12.4 10.0 - 15.0 %    Platelet Count 240 150 - 450 10e3/uL   Comprehensive metabolic panel     Status: Abnormal   Result Value Ref Range    Sodium 135 (L) 136 - 145 mmol/L    Potassium 3.9 3.4 - 5.3 mmol/L    Chloride 102 98 - 107 mmol/L    Carbon Dioxide (CO2) 27 22 - 29 mmol/L    Anion Gap 6 (L) 7 - 15 mmol/L    Urea Nitrogen 10.9 6.0 - 20.0 mg/dL    Creatinine 0.87 0.67 - 1.17 mg/dL    Calcium 8.7 8.6 - 10.0 mg/dL    Glucose 86 70 - 99 mg/dL    Alkaline Phosphatase 64 40 - 129 U/L    AST 16 10 - 50 U/L    ALT 17 10 - 50 U/L    Protein Total 6.6 6.4 - 8.3 g/dL    Albumin 3.9 3.5 - 5.2 g/dL    Bilirubin Total 0.6 <=1.2 mg/dL    GFR Estimate >90 >60 mL/min/1.73m2   Extra Tube     Status: None    Narrative    The following orders were created for panel order Extra Tube.  Procedure                               Abnormality         Status                     ---------                               -----------         ------                     Extra Blue Top Tube[[id number]]                              Final result                 Please view results for these tests on the individual orders.   Extra Blue Top Tube      Status: None   Result Value Ref Range    Hold Specimen [facility]    Basic metabolic panel     Status: Abnormal   Result Value Ref Range    Sodium 137 136 - 145 mmol/L    Potassium 4.2 3.4 - 5.3 mmol/L    Chloride 106 98 - 107 mmol/L    Carbon Dioxide (CO2) 24 22 - 29 mmol/L    Anion Gap 7 7 - 15 mmol/L    Urea Nitrogen 8.8 6.0 - 20.0 mg/dL    Creatinine 0.93 0.67 - 1.17 mg/dL    Calcium 8.7 8.6 - 10.0 mg/dL    Glucose 117 (H) 70 - 99 mg/dL    GFR Estimate >90 >60 mL/min/1.73m2   CBC with platelets     Status: Abnormal   Result Value Ref Range    WBC Count 5.8 4.0 - 11.0 10e3/uL    RBC Count 4.00 (L) 4.40 - 5.90 10e6/uL    Hemoglobin 12.4 (L) 13.3 - 17.7 g/dL    Hematocrit 38.2 (L) 40.0 - 53.0 %    MCV 96 78 - 100 fL    MCH 31.0 26.5 - 33.0 pg    MCHC 32.5 31.5 - 36.5 g/dL    RDW 12.6 10.0 - 15.0 %    Platelet Count 211 150 - 450 10e3/uL   Blood Culture Hand, Left     Status: Normal (Preliminary result)    Specimen: Hand, Left; Blood   Result Value Ref Range    Culture No growth after 12 hours     Narrative    Only an Aerobic Blood Culture Bottle was collected, interpret results with caution.       Blood Culture Arm, Right     Status: Normal (Preliminary result)    Specimen: Arm, Right; Blood   Result Value Ref Range    Culture No growth after 12 hours           Attestation:  I have reviewed today's vital signs, notes, medications, labs and imaging with Dr. Luis Fernando Coulter.  Amount of time performed on this consult: 50 minutes.    Kalani Cervantes PA-C

## 2023-03-22 NOTE — DISCHARGE INSTRUCTIONS
Dear Abdoul Bobby,    We are suggesting the following medication changes:  1) continue the Augmentin that you are previously prescribed until completion.  2) I prescribed 4 tablets of oxycodone for pain control as needed    Please follow up with:  1) your primary care provider within the next 1 week to ensure improvement of your infection      When to be concerned/return to the hospital  Please return to the ER if your develop any worsening of redness or swelling in your hand especially if associated with fevers and chills since this could be a sign of worsening infection.      It was a pleasure meeting you. Thank you for allowing me and my team the privilege of caring for you during your hospitalization. You are the reason we are here, and I truly hope we provided you with the excellent service you deserve. Please let us know if there is anything else we can do for you so that we can be sure you are leaving completely satisfied with your care experience. If you have any further questions please contact your primary care provider.    Sincerely,    Coy Rush PA-C  Internal Medicine Hospitalist

## 2023-03-22 NOTE — UTILIZATION REVIEW
"  Admission Status; Secondary Review Determination         Under the authority of the Utilization Management Committee, the utilization review process indicated a secondary review on the above patient.  The review outcome is based on review of the medical records, discussions with staff, and applying clinical experience noted on the date of the review.        ()      Inpatient Status Appropriate - This patient's medical care is consistent with medical management for inpatient care and reasonable inpatient medical practice.      (X) Observation Status Appropriate - This patient does not meet hospital inpatient criteria and is placed in observation status. If this patient's primary payer is Medicare and was admitted as an inpatient, Condition Code 44 should be used and patient status changed to \"observation\".   () Admission Status NOT Appropriate - This patient's medical care is not consistent with medical management for Inpatient or Observation Status.          RATIONALE FOR DETERMINATION     Patient is a 52 year old male who was bitten and scratched on the right hand by a cat on 3/19/23.  He was initiated on oral antibiotics on 3/20/23. On the day of admission, he had spreading erythema up his arm with associated tenderness around his forearm and elbow.   Cultures were obtained and patient was placed on IV antibiotics.  Orthopedics and Infectious Disease were consulted.  Patient has improved with plans for discharge today.  He is appropriate for observation status.  I spoke with Coy Rush PA-C.    The severity of illness, intensity of service provided, expected LOS and risk for adverse outcome make the care complex, high risk and appropriate for hospital admission.        The information on this document is developed by the utilization review team in order for the business office to ensure compliance.  This only denotes the appropriateness of proper admission status and does not reflect the quality of care " rendered.         The definitions of Inpatient Status and Observation Status used in making the determination above are those provided in the CMS Coverage Manual, Chapter 1 and Chapter 6, section 70.4.      Sincerely,     Riaz Carlisle MD  Physician Advisor  Utilization Review/ Case Management  [facility].

## 2023-03-22 NOTE — CONSULTS
Consult Date: 03/22/2023    INFECTIOUS DISEASE CONSULTATION    LOCATION:  Room 2412, [facility].    REFERRING PHYSICIAN:  Dr. Leong.    IMPRESSIONS:     1.  A 52-year-old male with acute right hand cat bite-related infection, had some scratches, but they are irrelevant.  The bite is the clear inciting cause, immediate impressive cellulitis and infection, almost certainly Pasteurella, major lymphangitis as well reported with Pasteurella but also raise the possibility of strep, no reason to think this is some more unusual organism.  2.  No major clinical sepsis.  Blood cultures pending.    RECOMMENDATIONS:   1.  Rapidly improved initial impressive looking hand infection, but no evidence of any deep infection, very predictable microbiology or no need for the broad antibiotics.  Certainly no need for doxycycline for cat scratch fever.  The issue here is the bite. Give a dose of ceftriaxone now, then would be acceptable to go back to Augmentin 875 b.i.d. for another 10 days and home as soon as now.  If we want to wait one more day, that is acceptable.  2.  If worsens or hand worsens, obviously readdress the issue, but unlikely there is any deep infection.  3.  Cat is under observation, so no rabies issue.    HISTORY OF PRESENT ILLNESS:  This 52-year-old male is seen in consultation.  He had an acute cat bite in his right hand on 03/19/2023.  By 03/20/2023, he had significant erythema, quite impressive looking picture is rapidly recurring infection almost certainly Pasteurella, sought medical attention, put on Augmentin, had two doses.  The hand was not improving and in fact worsened and sought medical attention.  This is obviously not a failure of the Augmentin microbiologically but simply needed more aggressive treatment.  No major clinical sepsis, fevers, chills, sweats, etc.    PAST MEDICAL HISTORY:  Otherwise unremarkable healthy male.    ALLERGIES:  AS IT TURNS OUT CAT ALLERGIC.     MEDICATIONS:   As listed.    REVIEW OF SYSTEMS:  Unremarkable for major systemic symptoms.  Hand pain has been modest, has some difficulty moving the two middle fingers, but now able to do quite well.    PHYSICAL EXAMINATION:    GENERAL:  The patient appears his stated age, he looks well.  VITAL SIGNS:  All normal, including being afebrile.  HEENT:  No thrush or oropharyngeal lesion.  Pupils reactive.  NECK:  Supple and nontender.  HEART:  Unremarkable.  LUNGS:  Unremarkable.  ABDOMEN:  Soft, nontender.  EXTREMITIES:  Impressive looking pictures for the initial infection, dramatically improved.  No evidence of deep infection in the hand.  The two bite marks are noted.  Nothing for abscess underneath those areas.  Significant marked lymphangitis is also dramatically improved.    LABORATORY DATA:  Blood cultures done, less than 24 hours old, but so far negative.  White count in the normal range.    Thank you very much for the consultation.  I will follow the patient with you.    Wale John MD        D: 2023   T: 2023   MT: NATHAN    Name:     BELEN THAKKARHilaria  MRN:      [id number]        Account:      [id number]   :      1970           Consult Date: 2023     Document: [id number]

## 2023-03-26 LAB
BACTERIA BLD CULT: NO GROWTH
BACTERIA BLD CULT: NO GROWTH

## 2023-05-13 ENCOUNTER — HEALTH MAINTENANCE LETTER (OUTPATIENT)
Age: 53
End: 2023-05-13

## 2024-07-20 ENCOUNTER — HEALTH MAINTENANCE LETTER (OUTPATIENT)
Age: 54
End: 2024-07-20

## 2025-06-30 ENCOUNTER — PATIENT OUTREACH (OUTPATIENT)
Dept: CARE COORDINATION | Facility: CLINIC | Age: 55
End: 2025-06-30
Payer: COMMERCIAL

## 2025-08-09 ENCOUNTER — HEALTH MAINTENANCE LETTER (OUTPATIENT)
Age: 55
End: 2025-08-09